# Patient Record
Sex: MALE
[De-identification: names, ages, dates, MRNs, and addresses within clinical notes are randomized per-mention and may not be internally consistent; named-entity substitution may affect disease eponyms.]

---

## 2023-02-07 PROBLEM — Z00.00 ENCOUNTER FOR PREVENTIVE HEALTH EXAMINATION: Status: ACTIVE | Noted: 2023-02-07

## 2023-02-10 ENCOUNTER — APPOINTMENT (OUTPATIENT)
Dept: ORTHOPEDIC SURGERY | Facility: CLINIC | Age: 38
End: 2023-02-10
Payer: COMMERCIAL

## 2023-02-10 VITALS
SYSTOLIC BLOOD PRESSURE: 127 MMHG | HEART RATE: 52 BPM | HEIGHT: 71 IN | BODY MASS INDEX: 29.4 KG/M2 | WEIGHT: 210 LBS | DIASTOLIC BLOOD PRESSURE: 84 MMHG

## 2023-02-10 DIAGNOSIS — M25.562 PAIN IN LEFT KNEE: ICD-10-CM

## 2023-02-10 PROCEDURE — 99203 OFFICE O/P NEW LOW 30 MIN: CPT

## 2023-02-10 PROCEDURE — 73564 X-RAY EXAM KNEE 4 OR MORE: CPT | Mod: LT

## 2023-02-10 PROCEDURE — 72170 X-RAY EXAM OF PELVIS: CPT

## 2023-02-11 PROBLEM — M25.562 LEFT KNEE PAIN: Status: ACTIVE | Noted: 2023-02-10

## 2023-02-11 NOTE — HISTORY OF PRESENT ILLNESS
[de-identified] : Mr. Cisneros is a 37-year-old male who presented to the office for evaluation of his left knee pain.  Patient has been experiencing left knee pain since 2/1/2023, when he ran up 3 flights of stairs.  Patient felt knee pain over the anterior knee.  He states that the pain has improved somewhat, but he continues to have knee pain with certain motions.  Pain is worse with knee flexion and going up stairs.  Describes the pain as a sharp pain.  Pain is located over the anterior knee/patella.  No significant pain at rest.  No history of knee injury.  No hip or groin pain.  No back pain.  No radiation of the pain.  No numbness or tingling.  No fevers or chills.

## 2023-02-11 NOTE — PHYSICAL EXAM
[de-identified] : Constitutional:  37 year old male, alert and oriented, cooperative, in no acute distress.\par \par HEENT \par NC/AT.  Appearance: symmetric\par \par Neck/Back\par Straight without deformity or instability.  Good ROM.\par \par Chest/Respiratory \par Respiratory effort: no intercostal retractions or use of accessory muscles. Nonlabored Breathing\par \par Skin \par On inspection, warm and dry without rashes or lesions.\par \par Mental Status: \par Judgment, insight: intact\par Orientation: oriented to time, place, and person\par \par Neurological:\par Sensory and Motor are grossly intact throughout\par \par Left Knee\par \par Inspection:\par     Skin intact, no rashes or lesions\par     No Effusion\par     Non-tender to palpation over tibial tubercle, patella, medial and lateral joint line, and pes insertion.\par \par Range of Motion:\par 	Extension - 0 degrees\par 	Flexion - 130 degrees\par 	Extensor lag: None\par \par Stability:\par      Demonstrates no Varus or Valgus instability\par      Negative Anterior or Posterior drawer.\par      Negative Lachman's\par      Negative McMurrays\par \par Patella: stable, tracks well. \par \par Neurologic Exam\par     Motor intact including 5/5 Extensor Hallucis Longus, 5/5 Flexor Hallucis Longus, 5/5 Tibialis Anterior and 5/5 Gastrocnemius\par     Sensation Intact to Light Touch including Saphenous, Sural, Superficial Peroneal, Deep Peroneal, Tibial nerve distributions\par \par Vascular Exam\par     Foot is warm and well perfused with 2+ Dorsalis Pedis Pulse \par \par No pain with range of motion of the bilateral hips or right knee. No lumbar paraspinal muscle tenderness.  [de-identified] : XRay:  XRays of the Pelvis (1 View) taken in the office today and discussed with the patient. XRays demonstrate no obvious fracture or dislocation. There is no significant evidence of osteoarthritis or osteophyte formation. (my personal interpretation). \par \par XRay:  XRays of the Left Knee (4 Views) taken in the office today and discussed with the patient. XRays demonstrate no obvious fracture or dislocation. There is no significant evidence of osteoarthritis or osteophyte formation. (my personal interpretation).\par

## 2023-02-11 NOTE — DISCUSSION/SUMMARY
[de-identified] : Mr. Cisneros is a 37-year-old male who presented to the office for evaluation of his left knee pain.  Patient has been experiencing left knee pain since 2/1/2023, when he was running up the stairs.  Pain is located over the anterior knee.  Patient states that the pain has improved, but he still has pain with knee flexion and with stairs.  X-rays showed no obvious fracture or dislocation.  Examination showed good knee range of motion.  Discussed with patient examination and imaging findings.  Discussed with patient potential etiologies of his knee pain including, but not limited to, patellofemoral pain, knee sprain, knee strain, ligament injury, and meniscus injury.  Discussed with patient the management of his knee pain at this time, including physical therapy, anti-inflammatories, and bracing.  Patient was given a left knee patellar J brace.  Patient was given referral to physical therapy.  Discussed home exercises for quadriceps strengthening.  He will take over-the-counter anti-inflammatories as needed.  Patient will follow-up in 2 to 3 weeks for reevaluation and management.  Patient understanding and in agreement with the plan.  All questions answered.\par \par Plan:\par -Physical therapy for left knee pain\par -Home exercises for quadriceps strengthening\par -Left knee patellar J brace\par -Over-the-counter anti-inflammatories as needed\par -Follow-up in 2 to 3 weeks for reevaluation and management

## 2023-03-03 ENCOUNTER — APPOINTMENT (OUTPATIENT)
Dept: ORTHOPEDIC SURGERY | Facility: CLINIC | Age: 38
End: 2023-03-03

## 2023-03-03 NOTE — HISTORY OF PRESENT ILLNESS
[de-identified] : Mr. Cisneros is a 37-year-old male who presented to the office for evaluation of his left knee pain.  Patient has been experiencing left knee pain since 2/1/2023, when he ran up 3 flights of stairs.  Patient felt knee pain over the anterior knee.  He states that the pain has improved somewhat, but he continues to have knee pain with certain motions.  Pain is worse with knee flexion and going up stairs.  Describes the pain as a sharp pain.  Pain is located over the anterior knee/patella.  No significant pain at rest.  No history of knee injury.  No hip or groin pain.  No back pain.  No radiation of the pain.  No numbness or tingling.  No fevers or chills.

## 2023-03-03 NOTE — PHYSICAL EXAM
[de-identified] : Constitutional:  37 year old male, alert and oriented, cooperative, in no acute distress.\par \par HEENT \par NC/AT.  Appearance: symmetric\par \par Neck/Back\par Straight without deformity or instability.  Good ROM.\par \par Chest/Respiratory \par Respiratory effort: no intercostal retractions or use of accessory muscles. Nonlabored Breathing\par \par Skin \par On inspection, warm and dry without rashes or lesions.\par \par Mental Status: \par Judgment, insight: intact\par Orientation: oriented to time, place, and person\par \par Neurological:\par Sensory and Motor are grossly intact throughout\par \par Left Knee\par \par Inspection:\par     Skin intact, no rashes or lesions\par     No Effusion\par     Non-tender to palpation over tibial tubercle, patella, medial and lateral joint line, and pes insertion.\par \par Range of Motion:\par 	Extension - 0 degrees\par 	Flexion - 130 degrees\par 	Extensor lag: None\par \par Stability:\par      Demonstrates no Varus or Valgus instability\par      Negative Anterior or Posterior drawer.\par      Negative Lachman's\par      Negative McMurrays\par \par Patella: stable, tracks well. \par \par Neurologic Exam\par     Motor intact including 5/5 Extensor Hallucis Longus, 5/5 Flexor Hallucis Longus, 5/5 Tibialis Anterior and 5/5 Gastrocnemius\par     Sensation Intact to Light Touch including Saphenous, Sural, Superficial Peroneal, Deep Peroneal, Tibial nerve distributions\par \par Vascular Exam\par     Foot is warm and well perfused with 2+ Dorsalis Pedis Pulse \par \par No pain with range of motion of the bilateral hips or right knee. No lumbar paraspinal muscle tenderness.  [de-identified] : XRay:  XRays of the Pelvis (1 View) taken 2/10/2023. XRays demonstrate no obvious fracture or dislocation. There is no significant evidence of osteoarthritis or osteophyte formation. (my personal interpretation). \par \par XRay:  XRays of the Left Knee (4 Views) taken 2/10/2023. XRays demonstrate no obvious fracture or dislocation. There is no significant evidence of osteoarthritis or osteophyte formation. (my personal interpretation).\par

## 2024-01-05 ENCOUNTER — APPOINTMENT (OUTPATIENT)
Dept: ORTHOPEDIC SURGERY | Facility: CLINIC | Age: 39
End: 2024-01-05
Payer: COMMERCIAL

## 2024-01-05 VITALS — WEIGHT: 215 LBS | BODY MASS INDEX: 30.1 KG/M2 | HEIGHT: 71 IN

## 2024-01-05 DIAGNOSIS — M22.2X2 PATELLOFEMORAL DISORDERS, RIGHT KNEE: ICD-10-CM

## 2024-01-05 DIAGNOSIS — M22.2X1 PATELLOFEMORAL DISORDERS, RIGHT KNEE: ICD-10-CM

## 2024-01-05 PROCEDURE — 99213 OFFICE O/P EST LOW 20 MIN: CPT

## 2024-01-05 PROCEDURE — 73564 X-RAY EXAM KNEE 4 OR MORE: CPT | Mod: 50

## 2024-01-07 NOTE — PHYSICAL EXAM
[de-identified] : Constitutional:  38 year old male, alert and oriented, cooperative, in no acute distress.  HEENT  NC/AT.  Appearance: symmetric  Neck/Back Straight without deformity or instability.  Good ROM.  Chest/Respiratory  Respiratory effort: no intercostal retractions or use of accessory muscles. Nonlabored Breathing  Skin  On inspection, warm and dry without rashes or lesions.  Mental Status:  Judgment, insight: intact Orientation: oriented to time, place, and person  Neurological: Sensory and Motor are grossly intact throughout  Left Knee  Inspection:     Skin intact, no rashes or lesions     No Effusion     Non-tender to palpation over tibial tubercle, patella, medial and lateral joint line, and pes insertion.  Range of Motion: 	Extension - 0 degrees 	Flexion - 130 degrees 	Extensor lag: None  Stability:      Demonstrates no Varus or Valgus instability      Negative Anterior or Posterior drawer.      Negative Lachman's  Patella: stable, tracks well.    Right Knee  Inspection:     Skin intact, no rashes or lesions     No Effusion     Non-tender to palpation over tibial tubercle, medial and lateral joint line, and pes insertion.     Mild discomfort with patellar compression and patellar translation  Range of Motion: 	Extension - 0 degrees 	Flexion - 130 degrees 	Extensor lag: None  Stability:      Demonstrates no Varus or Valgus instability      Negative Anterior or Posterior drawer.      Negative Lachman's  Patella: stable, tracks well.   Neurologic Exam     Motor intact including 5/5 Extensor Hallucis Longus, 5/5 Flexor Hallucis Longus, 5/5 Tibialis Anterior and 5/5 Gastrocnemius     Sensation Intact to Light Touch including Saphenous, Sural, Superficial Peroneal, Deep Peroneal, Tibial nerve distributions  Vascular Exam     Foot is warm and well perfused   No pain with range of motion of the bilateral hips or right knee. No lumbar paraspinal muscle tenderness.  [de-identified] : XRay:  XRays of the Pelvis (1 View) taken on 2/10/2023. XRays demonstrate no obvious fracture or dislocation. There is no significant evidence of osteoarthritis or osteophyte formation. (my personal interpretation).   XRay:  XRays of the Left Knee (4 Views) taken in the office today and discussed with the patient. XRays demonstrate no obvious fracture or dislocation. There is no significant evidence of osteoarthritis or osteophyte formation. There is lateral patellar tracking seen on the [place] view. (my personal interpretation).  XRay:  XRays of the Right  Knee (4 Views) taken in the office today and discussed with the patient. XRays demonstrate no obvious fracture or dislocation. There is no significant evidence of osteoarthritis or osteophyte formation. There is lateral patellar tracking seen on the [place] view. (my personal interpretation).

## 2024-01-07 NOTE — HISTORY OF PRESENT ILLNESS
[de-identified] : 1/4/2024  Ramo Cisneros presents to the office for evaluation of his right knee pain.  Patient was previously seen in the office with left knee pain after playing football.  He played football again in August and no pain in his right knee.  Patient had difficulty with lateral movements on the right knee and uses braces.  He can have occasional left knee pain, but is not as severe as the right.  Pain is located over the anterior knee.  He does not report instability.  No pop in the knee.  Pain is worse with stairs, running, kneeling, squatting, and prolonged walking.  He is not taking pain medications.  He has not tried physical therapy.  Patient uses a compression sleeve brace.  2/10/2023 Mr. Cisneros is a 37-year-old male who presented to the office for evaluation of his left knee pain.  Patient has been experiencing left knee pain since 2/1/2023, when he ran up 3 flights of stairs.  Patient felt knee pain over the anterior knee.  He states that the pain has improved somewhat, but he continues to have knee pain with certain motions.  Pain is worse with knee flexion and going up stairs.  Describes the pain as a sharp pain.  Pain is located over the anterior knee/patella.  No significant pain at rest.  No history of knee injury.  No hip or groin pain.  No back pain.  No radiation of the pain.  No numbness or tingling.  No fevers or chills.  History: None

## 2024-01-07 NOTE — DISCUSSION/SUMMARY
[de-identified] : Ramo Cisneros is a 37-year-old male who presented to the office for evaluation of his right knee pain.  Patient has been experiencing right knee pain since playing football in August.  Pain is located over the anterior knee.  X-rays showed no obvious fracture dislocations.  There was lateral patellar tracking seen on the sunrise view.  Examination showed mild discomfort with patellar compression and patellar translation.  Discussed with patient examination and imaging findings.  Discussed with patient that his knee pain is possibly secondary to patellofemoral pain.  Discussed with the patient the management of his pain at this time, including physical therapy, home exercises, bracing, and anti-inflammatories.  Patient was given a referral to physical therapy.  Discussed quadriceps strengthening.  Patient will use his home patellar J brace.  Patient will take over-the-counter anti-inflammatories as needed for pain control.  Patient will follow-up in 2 months for reevaluation and management.  Patient understanding and in agreement with the plan.  All questions answered.  Plan: -Physical therapy -Home exercises for quadriceps strengthening -Home Patellar J Braces -Over-the-counter anti-inflammatories as needed -Follow-up in 2 months for reevaluation and management

## 2024-12-29 ENCOUNTER — NON-APPOINTMENT (OUTPATIENT)
Age: 39
End: 2024-12-29